# Patient Record
Sex: MALE | Race: WHITE
[De-identification: names, ages, dates, MRNs, and addresses within clinical notes are randomized per-mention and may not be internally consistent; named-entity substitution may affect disease eponyms.]

---

## 2019-11-09 ENCOUNTER — HOSPITAL ENCOUNTER (EMERGENCY)
Dept: HOSPITAL 18 - NAV ERS | Age: 54
Discharge: HOME | End: 2019-11-09
Payer: COMMERCIAL

## 2019-11-09 DIAGNOSIS — Z79.899: ICD-10-CM

## 2019-11-09 DIAGNOSIS — J45.909: ICD-10-CM

## 2019-11-09 DIAGNOSIS — F17.210: ICD-10-CM

## 2019-11-09 DIAGNOSIS — I10: ICD-10-CM

## 2019-11-09 DIAGNOSIS — S62.341A: Primary | ICD-10-CM

## 2019-11-09 PROCEDURE — 96374 THER/PROPH/DIAG INJ IV PUSH: CPT

## 2019-11-09 PROCEDURE — 29125 APPL SHORT ARM SPLINT STATIC: CPT

## 2019-11-09 PROCEDURE — G0390 TRAUMA RESPONS W/HOSP CRITI: HCPCS

## 2019-11-09 NOTE — RAD
Exam:

Left wrist 3 views:



HISTORY:

Injury



FINDINGS:

Essentially nondisplaced comminuted fractures of the base of the second metacarpal. The remainder of 
the wrist appears intact.



IMPRESSION:

Essentially nondisplaced comminuted fractures involving the base of the second metacarpal.



Reported By: Augie Greer 

Electronically Signed:  11/9/2019 4:13 PM

## 2019-11-09 NOTE — RAD
Exam:

Left hand 3 views:



HISTORY:

Injury



Minimally comminuted fractures involving the base of the second metacarpal. The patient's ring overli
es the proximal phalanx of the fourth finger. Deformity of the distal tuft of the distal phalanx of

the middle finger which appears old.



IMPRESSION:

Comminuted fracture without significant displacement involving the base of the second metacarpal.



Reported By: Augie Greer 

Electronically Signed:  11/9/2019 4:13 PM